# Patient Record
Sex: MALE
[De-identification: names, ages, dates, MRNs, and addresses within clinical notes are randomized per-mention and may not be internally consistent; named-entity substitution may affect disease eponyms.]

---

## 2023-02-07 PROBLEM — Z00.00 ENCOUNTER FOR PREVENTIVE HEALTH EXAMINATION: Status: ACTIVE | Noted: 2023-02-07

## 2023-02-08 ENCOUNTER — APPOINTMENT (OUTPATIENT)
Dept: UROLOGY | Facility: CLINIC | Age: 48
End: 2023-02-08
Payer: COMMERCIAL

## 2023-02-08 VITALS
OXYGEN SATURATION: 97 % | WEIGHT: 240 LBS | SYSTOLIC BLOOD PRESSURE: 152 MMHG | RESPIRATION RATE: 18 BRPM | HEIGHT: 68 IN | BODY MASS INDEX: 36.37 KG/M2 | HEART RATE: 87 BPM | DIASTOLIC BLOOD PRESSURE: 79 MMHG

## 2023-02-08 DIAGNOSIS — Z78.9 OTHER SPECIFIED HEALTH STATUS: ICD-10-CM

## 2023-02-08 DIAGNOSIS — Z83.3 FAMILY HISTORY OF DIABETES MELLITUS: ICD-10-CM

## 2023-02-08 DIAGNOSIS — I10 ESSENTIAL (PRIMARY) HYPERTENSION: ICD-10-CM

## 2023-02-08 DIAGNOSIS — Z82.49 FAMILY HISTORY OF ISCHEMIC HEART DISEASE AND OTHER DISEASES OF THE CIRCULATORY SYSTEM: ICD-10-CM

## 2023-02-08 DIAGNOSIS — R31.29 OTHER MICROSCOPIC HEMATURIA: ICD-10-CM

## 2023-02-08 DIAGNOSIS — Z72.0 TOBACCO USE: ICD-10-CM

## 2023-02-08 LAB
BILIRUB UR QL STRIP: NEGATIVE
GLUCOSE UR-MCNC: NEGATIVE
HCG UR QL: 0.2 EU/DL
HGB UR QL STRIP.AUTO: NORMAL
KETONES UR-MCNC: NEGATIVE
LEUKOCYTE ESTERASE UR QL STRIP: NEGATIVE
NITRITE UR QL STRIP: NEGATIVE
PH UR STRIP: 6
PROT UR STRIP-MCNC: NEGATIVE
SP GR UR STRIP: >=1.03

## 2023-02-08 PROCEDURE — 81003 URINALYSIS AUTO W/O SCOPE: CPT | Mod: QW

## 2023-02-08 PROCEDURE — 99203 OFFICE O/P NEW LOW 30 MIN: CPT

## 2023-02-08 NOTE — LETTER BODY
[Dear  ___] : Dear  [unfilled], [Consult Letter:] : I had the pleasure of evaluating your patient, [unfilled]. [Please see my note below.] : Please see my note below. [Consult Closing:] : Thank you very much for allowing me to participate in the care of this patient.  If you have any questions, please do not hesitate to contact me. [Sincerely,] : Sincerely, [FreeTextEntry3] : Jorge Fischer, DO\par Genitourinary Medicine\par

## 2023-02-08 NOTE — HISTORY OF PRESENT ILLNESS
[FreeTextEntry1] : Mr. LOIS CHATTERJEE 47 year old  M  PMH HTN and no PSH. Pt comes in bc he was told he had blood in his urine. Pt has never seen blood. Pt urinating well. Erections are good. No FMH of cancer. Current smoker for about 25 years. \par

## 2023-02-09 LAB — PSA SERPL-MCNC: 0.61 NG/ML

## 2023-02-13 LAB
APPEARANCE: CLEAR
BACTERIA UR CULT: NORMAL
BACTERIA: NEGATIVE
BILIRUBIN URINE: NEGATIVE
BLOOD URINE: ABNORMAL
COLOR: YELLOW
GLUCOSE QUALITATIVE U: NEGATIVE
HYALINE CASTS: 0 /LPF
KETONES URINE: NEGATIVE
LEUKOCYTE ESTERASE URINE: NEGATIVE
MICROSCOPIC-UA: NORMAL
NITRITE URINE: NEGATIVE
PH URINE: 6
PROTEIN URINE: NORMAL
RED BLOOD CELLS URINE: 1 /HPF
SPECIFIC GRAVITY URINE: 1.02
SQUAMOUS EPITHELIAL CELLS: 0 /HPF
UROBILINOGEN URINE: NORMAL
WHITE BLOOD CELLS URINE: 0 /HPF

## 2023-02-16 ENCOUNTER — APPOINTMENT (OUTPATIENT)
Dept: UROLOGY | Facility: CLINIC | Age: 48
End: 2023-02-16

## 2024-04-15 ENCOUNTER — NON-APPOINTMENT (OUTPATIENT)
Age: 49
End: 2024-04-15

## 2024-04-18 ENCOUNTER — APPOINTMENT (OUTPATIENT)
Dept: PODIATRY | Facility: CLINIC | Age: 49
End: 2024-04-18
Payer: COMMERCIAL

## 2024-04-18 PROCEDURE — 99204 OFFICE O/P NEW MOD 45 MIN: CPT | Mod: 25

## 2024-04-18 PROCEDURE — 17110 DESTRUCTION B9 LES UP TO 14: CPT

## 2024-04-18 PROCEDURE — 11721 DEBRIDE NAIL 6 OR MORE: CPT

## 2024-04-22 NOTE — ASSESSMENT
[FreeTextEntry1] : Assessment: Onychomycosis caused by T. Rubrum. Peripheral vascular disease. Verruca plantaris, one lesion, left foot. Xerotic dermatitis with fissuring.  Plan: I discussed the treatment plan with the patient regarding the fungal nails.  I discussed oral vs topical treatment.  The patient chose the topical treatment at this time and I explained that it takes approximately 15 months for the nails to grow out.  I explained that he will be returning every other month for follow up for the nails.  Debridement of each toenail on each foot was performed both mechanically and via electrical grinding.  All toenails were trimmed with a 14 cm sterile stainless steel box lock double spring nail splitter.  Then utilizing a sterile pear shaped adilson yolande (this device falls under bur, surgical, general & plastic surgery.  The FDA deems this item a Class-1 device) via a 35,000 RPM electric drill and vacuum and dust extractor system all toenails were aseptically debrided removing fungal layers.  This is done to diminish the fungal load of the toenails and enhance the effects of the antifungal medication, allowing overall improvement in the degree of fungal infection as well as improve appearance and reduce discomfort and help diminish chances of secondary bacterial infection, also lessening the chance of ingrown nails, especially when performed on a regular basis.   Patient was dispensed Clarus Tolnaftate 1% solution to reduce and eliminate the fungal infection in the toenails and to improve the nail quality and decrease pain.  Patient is to apply it to the toenails twice a day.  Patient was dispensed Clean Sweep, which is a non-toxic antimicrobial, antibacterial and antifungal spray used to disinfect the shoe gear.  Patient was instructed to spray it into all pairs of shoes that they own once a week and if they are wearing the shoes every day, at least twice a week.  They were also dispensed written information about the product.  I discussed various treatment options including surgical removal of the wart(s) and the patient opted for conservative treatment at this time.  I then performed aseptic debridement of all the verrucous tissue followed by application of Trichloracetic acid for chemical destruction of the warts. I dispensed to the patient VerruStat topical solution Salicylic Acid 17% in a vehicle containing Retinyl Palmitate and penetration-enhancer MSM Unique wart removing formulation with added Vitamin A derivative, Retinyl Palmitate, and permeation-enhancing agents.  VerruStat has been specially formulated for the topical management and removal of clinically common and palmoplantar warts. VerruStatTM is dispensed only by physicians. The patient was given instructions to apply the medication to the wart(s) 2x a day. I explained that it had a film that builds up and can be filed by the patient, but not picked at.  No covering would be necessary. The patient was advised not to walk barefoot.  I advised the patient that warts were contagious and discussed methods of avoiding the spreading of warts.  Patient is to return in 2 weeks for follow-up. I then performed aseptic debridement of all fissured skin to partial thickness  with a sterile #15 blade to remove the initial layers of dead skin.  Following the initial debridement, an umbrella yolande was then gently applied against the dry skin scraping away the remaining layers, and smoothing the area.   The patient was then advised to moisturize their feet twice daily with an emollient cream. The patient was dispensed Sorbidon Hydrate cream to be applied 2x a day to both feet reduce the dry skin and improve the appearance as well as help prevent cracks in the skin leading to infection.  I advised the patient to notify the office right away if increased redness, swelling, pain, open wounds or discharge were observed.  PTR:  3 weeks.

## 2024-04-22 NOTE — PHYSICAL EXAM
[Ankle Swelling (On Exam)] : present [Ankle Swelling Bilaterally] : severe [] : on both lower extremities [Ankle Swelling On The Right] : mild [Delayed in the Right Toes] : capillary refills normal in right toes [Delayed in the Left Toes] : capillary refills normal in the left toes [0] : left foot dorsalis pedis 0 [TextEntry] : The toenails were elongated, thickened with yellowish discoloration on toes 1, 2, 3, 4 and 5 right foot and 1, 2, 3, 4 and 5 left foot.  Incurvation was noted on multiple toes with erythema around the toenail plates.  Patient exhibited pain upon palpation of the toenails, which caused marked limitation of ambulation.  There is significant lower extremity edema on both feet with nonpalpable pedal pulses.  On the plantar medial aspect of the left arch, there is a full-thickness verrucoid lesion which is painful to palpation.  There are partial and full thickness fissures on both great toes and around the circumferences of both heels.  These areas are painful to palpation.  No active bleeding is present.  No SOI are noted.

## 2024-04-22 NOTE — HISTORY OF PRESENT ILLNESS
[FreeTextEntry1] : Edd Allen is a 48-year-old male patient who presents today for his initial visit complaining of cracks in the skin on both feet, extremely dry skin, and trouble cutting his toenails.  He also thinks he has a wart in his left foot.  He states that he has had his circulation evaluated and his legs do swell, but he has had dry skin around the heels and arches and wanted to have that addressed.

## 2024-05-02 ENCOUNTER — APPOINTMENT (OUTPATIENT)
Dept: PODIATRY | Facility: CLINIC | Age: 49
End: 2024-05-02
Payer: COMMERCIAL

## 2024-05-02 VITALS — HEIGHT: 68 IN | WEIGHT: 315 LBS | BODY MASS INDEX: 47.74 KG/M2

## 2024-05-02 PROCEDURE — 99214 OFFICE O/P EST MOD 30 MIN: CPT | Mod: 25

## 2024-05-02 PROCEDURE — 17110 DESTRUCTION B9 LES UP TO 14: CPT | Mod: LT

## 2024-05-06 NOTE — PHYSICAL EXAM
[TextEntry] : The skin texture is greatly improved.  There are still areas of cracking, full thickness around the right hallux and around both heels.  In the left longitudinal arch, there is a verrucoid lesion.  It is macerated and has black spots.

## 2024-05-06 NOTE — ASSESSMENT
[FreeTextEntry1] : Assessment: Xerosis with fissuring, bilaterally Verruca plantaris, one lesion, left foot. Peripheral vascular disease.  Plan: I then performed aseptic debridement of all fissured skin to partial thickness  with a sterile #15 blade to remove the initial layers of dead skin.  Following the initial debridement, an umbrella yolande was then gently applied against the dry skin scraping away the remaining layers, and smoothing the area.   The patient was then advised to moisturize their feet twice daily with an emollient cream.  I performed with a sterile #15 blade on a sterile # 3 handle aseptic debridement of all the verrucous tissue followed by application of Trichloroacetic acid for chemical destruction of the warts. The patient was instructed to continue to apply the Verrustat to the effected area(s) twice per day and continue with the Clean Sweep spray to disinfect the shoes to help prevent recurrence.  PTR:  2 weeks.

## 2024-05-06 NOTE — HISTORY OF PRESENT ILLNESS
[FreeTextEntry1] : Edd Alejandro returns stating he has been using the Sorbidon and noticed there is a tremendous difference.  He states that the skin on both feet looks better.   He is still feeling the wart in the left arch.  He states that hurts a little bit.

## 2024-06-06 ENCOUNTER — APPOINTMENT (OUTPATIENT)
Dept: PODIATRY | Facility: CLINIC | Age: 49
End: 2024-06-06
Payer: COMMERCIAL

## 2024-06-06 DIAGNOSIS — B07.8 OTHER VIRAL WARTS: ICD-10-CM

## 2024-06-06 DIAGNOSIS — M79.672 PAIN IN LEFT FOOT: ICD-10-CM

## 2024-06-06 DIAGNOSIS — R23.4 CHANGES IN SKIN TEXTURE: ICD-10-CM

## 2024-06-06 DIAGNOSIS — L85.3 XEROSIS CUTIS: ICD-10-CM

## 2024-06-06 PROCEDURE — 99214 OFFICE O/P EST MOD 30 MIN: CPT | Mod: 25

## 2024-06-06 PROCEDURE — 17110 DESTRUCTION B9 LES UP TO 14: CPT

## 2024-06-10 PROBLEM — R23.4 FISSURE IN SKIN OF FOOT: Status: ACTIVE | Noted: 2024-05-06

## 2024-06-10 PROBLEM — L85.3 XEROSIS CUTIS: Status: ACTIVE | Noted: 2024-05-06

## 2024-06-10 PROBLEM — B07.8 VERRUCA PLANA: Status: ACTIVE | Noted: 2024-05-06

## 2024-06-10 PROBLEM — M79.672 LEFT FOOT PAIN: Status: ACTIVE | Noted: 2024-05-06

## 2024-06-10 NOTE — ASSESSMENT
[FreeTextEntry1] : Assessment: Verruca plantaris, one lesion, left foot. Xerosis with fissuring bilaterally.  Plan:   I performed aseptic debridement with a sterile #15 blade on a sterile # 3 handle of all the verrucous tissue followed by application of Trichloracetic acid for chemical destruction of the warts. Normal skin lines were present after the debridement without visualization of black dots.  The patient is to continue with the VerruStat 2x a day for one more week and then discontinue.  The was instructed to continue to disinfect their shoes with the Clean Sweep to prevent recurrence.  Patient is to observe the foot weekly for any new growths and return in 1 month. I then performed aseptic debridement of all fissured skin to partial thickness  with a sterile #15 blade to remove the initial layers of dead skin.  Following the initial debridement, an umbrella yolande was then gently applied against the dry skin scraping away the remaining layers, and smoothing the area.   The patient was then advised to moisturize their feet twice daily with an emollient cream.   PTR:  6 weeks.

## 2024-06-10 NOTE — HISTORY OF PRESENT ILLNESS
[FreeTextEntry1] : Edd Alejandro returns stating both feet are better.  He has been using the cream religiously and notices a great improvement.

## 2024-07-18 ENCOUNTER — APPOINTMENT (OUTPATIENT)
Dept: PODIATRY | Facility: CLINIC | Age: 49
End: 2024-07-18
Payer: COMMERCIAL

## 2024-07-18 VITALS — WEIGHT: 270 LBS | BODY MASS INDEX: 40.92 KG/M2 | HEIGHT: 68 IN

## 2024-07-18 DIAGNOSIS — S91.311A LACERATION W/OUT FOREIGN BODY, RIGHT FOOT, INITIAL ENCOUNTER: ICD-10-CM

## 2024-07-18 DIAGNOSIS — S91.319A LACERATION W/OUT FOREIGN BODY, UNSPECIFIED FOOT, INITIAL ENCOUNTER: ICD-10-CM

## 2024-07-18 PROCEDURE — 99214 OFFICE O/P EST MOD 30 MIN: CPT

## 2024-07-22 PROBLEM — S91.311A LACERATION OF RIGHT FOOT, INITIAL ENCOUNTER: Status: ACTIVE | Noted: 2024-07-22

## 2024-07-22 PROBLEM — S91.319A LACERATION OF FOOT: Status: ACTIVE | Noted: 2024-07-22

## 2024-08-01 ENCOUNTER — APPOINTMENT (OUTPATIENT)
Dept: PODIATRY | Facility: CLINIC | Age: 49
End: 2024-08-01
Payer: COMMERCIAL

## 2024-08-01 DIAGNOSIS — S91.319A LACERATION W/OUT FOREIGN BODY, UNSPECIFIED FOOT, INITIAL ENCOUNTER: ICD-10-CM

## 2024-08-01 DIAGNOSIS — L85.3 XEROSIS CUTIS: ICD-10-CM

## 2024-08-01 DIAGNOSIS — R23.4 CHANGES IN SKIN TEXTURE: ICD-10-CM

## 2024-08-01 PROCEDURE — 99214 OFFICE O/P EST MOD 30 MIN: CPT

## 2024-08-05 NOTE — HISTORY OF PRESENT ILLNESS
[FreeTextEntry1] : Edd returns for followup care of the laceration on the bottom of his right foot.  He states that he kept the bandage intact for three days and it is feeling a lot better.  He has been creaming the feet and they feel better.

## 2024-08-05 NOTE — ASSESSMENT
[FreeTextEntry1] : Assessment: Laceration, right foot. Xerosis with fissuring bilaterally.  P:  I then performed aseptic debridement of all fissured skin to partial thickness  with a sterile #15 blade to remove the initial layers of dead skin.  Following the initial debridement, an umbrella yolande was then gently applied against the dry skin scraping away the remaining layers, and smoothing the area.   The patient was then advised to moisturize their feet twice daily with an emollient cream. I advised the patient to notify the office right away if increased redness, swelling, pain, open wounds or discharge were observed.  PTR:  6 weeks.

## 2024-08-05 NOTE — PHYSICAL EXAM
[TextEntry] : On the plantar surface of the right foot, the laceration has filled in nicely.  There was some dry and cracked skin surrounding it but no longer a laceration.  No signs of infection are present.  Pain is diminished.  There is still significant cracking by the great toes on both feet.

## 2024-08-06 NOTE — PHYSICAL EXAM
[TextEntry] : There is scant verrucoid tissue in the left longitudinal arch.  The area is white with black spots and much less painful.  There is still some cracking around the heels and beneath the great toe joints, but the overall appearance of the feet is better. [Today's Date] : [unfilled] [Name] : Name: [unfilled] [] : : ~~ [Today's Date:] : [unfilled] [Dear  ___:] : Dear Dr. [unfilled]: [Consult] : I had the pleasure of evaluating your patient, [unfilled]. My full evaluation follows. [Consult - Single Provider] : Thank you very much for allowing me to participate in the care of this patient. If you have any questions, please do not hesitate to contact me. [Sincerely,] : Sincerely, [DrQuan  ___] : Dr. PALAFOX [FreeTextEntry4] : DR. RAMON ROSE MD [FreeTextEntry5] : [facility]  [de-identified] : Cass Jackson MD, FAAP, FACC  Pediatric Cardiologist  of Pediatrics [facility]

## 2024-09-12 ENCOUNTER — APPOINTMENT (OUTPATIENT)
Dept: PODIATRY | Facility: CLINIC | Age: 49
End: 2024-09-12

## 2024-10-24 ENCOUNTER — APPOINTMENT (OUTPATIENT)
Dept: PODIATRY | Facility: CLINIC | Age: 49
End: 2024-10-24
Payer: COMMERCIAL

## 2024-10-24 DIAGNOSIS — M79.674 PAIN IN RIGHT TOE(S): ICD-10-CM

## 2024-10-24 DIAGNOSIS — R23.4 CHANGES IN SKIN TEXTURE: ICD-10-CM

## 2024-10-24 DIAGNOSIS — B07.8 OTHER VIRAL WARTS: ICD-10-CM

## 2024-10-24 DIAGNOSIS — L85.3 XEROSIS CUTIS: ICD-10-CM

## 2024-10-24 DIAGNOSIS — B35.1 TINEA UNGUIUM: ICD-10-CM

## 2024-10-24 DIAGNOSIS — M79.675 PAIN IN LEFT TOE(S): ICD-10-CM

## 2024-10-24 DIAGNOSIS — M79.672 PAIN IN LEFT FOOT: ICD-10-CM

## 2024-10-24 PROCEDURE — 99214 OFFICE O/P EST MOD 30 MIN: CPT | Mod: 25

## 2024-10-24 PROCEDURE — 11721 DEBRIDE NAIL 6 OR MORE: CPT

## 2024-10-24 PROCEDURE — 17110 DESTRUCTION B9 LES UP TO 14: CPT

## 2024-10-28 PROBLEM — B35.1 ONYCHOMYCOSIS OF TOENAIL: Status: ACTIVE | Noted: 2024-10-28

## 2024-10-28 PROBLEM — M79.674 PAIN OF TOE OF RIGHT FOOT: Status: ACTIVE | Noted: 2024-10-28

## 2024-10-28 PROBLEM — M79.675 PAIN OF TOE OF LEFT FOOT: Status: ACTIVE | Noted: 2024-10-28

## 2024-12-05 ENCOUNTER — APPOINTMENT (OUTPATIENT)
Dept: PODIATRY | Facility: CLINIC | Age: 49
End: 2024-12-05

## 2024-12-12 ENCOUNTER — APPOINTMENT (OUTPATIENT)
Dept: PODIATRY | Facility: CLINIC | Age: 49
End: 2024-12-12

## 2024-12-12 DIAGNOSIS — B07.8 OTHER VIRAL WARTS: ICD-10-CM

## 2024-12-12 DIAGNOSIS — L85.3 XEROSIS CUTIS: ICD-10-CM

## 2024-12-12 DIAGNOSIS — R23.4 CHANGES IN SKIN TEXTURE: ICD-10-CM

## 2024-12-12 DIAGNOSIS — M79.672 PAIN IN LEFT FOOT: ICD-10-CM

## 2024-12-12 PROCEDURE — 17110 DESTRUCTION B9 LES UP TO 14: CPT

## 2025-01-23 ENCOUNTER — APPOINTMENT (OUTPATIENT)
Dept: PODIATRY | Facility: CLINIC | Age: 50
End: 2025-01-23
Payer: COMMERCIAL

## 2025-01-23 DIAGNOSIS — M79.675 PAIN IN LEFT TOE(S): ICD-10-CM

## 2025-01-23 DIAGNOSIS — L85.3 XEROSIS CUTIS: ICD-10-CM

## 2025-01-23 DIAGNOSIS — M79.674 PAIN IN RIGHT TOE(S): ICD-10-CM

## 2025-01-23 DIAGNOSIS — R23.4 CHANGES IN SKIN TEXTURE: ICD-10-CM

## 2025-01-23 DIAGNOSIS — M79.672 PAIN IN LEFT FOOT: ICD-10-CM

## 2025-01-23 DIAGNOSIS — B07.8 OTHER VIRAL WARTS: ICD-10-CM

## 2025-01-23 DIAGNOSIS — B35.1 TINEA UNGUIUM: ICD-10-CM

## 2025-01-23 PROCEDURE — 99214 OFFICE O/P EST MOD 30 MIN: CPT | Mod: 25

## 2025-01-23 PROCEDURE — 11721 DEBRIDE NAIL 6 OR MORE: CPT

## 2025-01-23 PROCEDURE — 17110 DESTRUCTION B9 LES UP TO 14: CPT

## 2025-03-06 ENCOUNTER — APPOINTMENT (OUTPATIENT)
Dept: PODIATRY | Facility: CLINIC | Age: 50
End: 2025-03-06

## 2025-03-13 ENCOUNTER — APPOINTMENT (OUTPATIENT)
Dept: PODIATRY | Facility: CLINIC | Age: 50
End: 2025-03-13
Payer: COMMERCIAL

## 2025-03-13 DIAGNOSIS — L85.3 XEROSIS CUTIS: ICD-10-CM

## 2025-03-13 DIAGNOSIS — R23.4 CHANGES IN SKIN TEXTURE: ICD-10-CM

## 2025-03-13 PROCEDURE — 99214 OFFICE O/P EST MOD 30 MIN: CPT

## 2025-04-22 ENCOUNTER — NON-APPOINTMENT (OUTPATIENT)
Age: 50
End: 2025-04-22

## 2025-04-24 ENCOUNTER — APPOINTMENT (OUTPATIENT)
Dept: PODIATRY | Facility: CLINIC | Age: 50
End: 2025-04-24
Payer: COMMERCIAL

## 2025-04-24 DIAGNOSIS — R23.4 CHANGES IN SKIN TEXTURE: ICD-10-CM

## 2025-04-24 DIAGNOSIS — B35.1 TINEA UNGUIUM: ICD-10-CM

## 2025-04-24 DIAGNOSIS — M79.674 PAIN IN RIGHT TOE(S): ICD-10-CM

## 2025-04-24 DIAGNOSIS — M79.675 PAIN IN LEFT TOE(S): ICD-10-CM

## 2025-04-24 DIAGNOSIS — L85.3 XEROSIS CUTIS: ICD-10-CM

## 2025-04-24 PROCEDURE — 99214 OFFICE O/P EST MOD 30 MIN: CPT | Mod: 25

## 2025-04-24 PROCEDURE — 11721 DEBRIDE NAIL 6 OR MORE: CPT

## 2025-06-05 ENCOUNTER — APPOINTMENT (OUTPATIENT)
Dept: PODIATRY | Facility: CLINIC | Age: 50
End: 2025-06-05
Payer: COMMERCIAL

## 2025-06-05 PROCEDURE — 99214 OFFICE O/P EST MOD 30 MIN: CPT

## 2025-07-17 ENCOUNTER — APPOINTMENT (OUTPATIENT)
Dept: PODIATRY | Facility: CLINIC | Age: 50
End: 2025-07-17

## 2025-07-31 ENCOUNTER — APPOINTMENT (OUTPATIENT)
Dept: PODIATRY | Facility: CLINIC | Age: 50
End: 2025-07-31
Payer: COMMERCIAL

## 2025-07-31 DIAGNOSIS — R23.4 CHANGES IN SKIN TEXTURE: ICD-10-CM

## 2025-07-31 DIAGNOSIS — M79.675 PAIN IN LEFT TOE(S): ICD-10-CM

## 2025-07-31 DIAGNOSIS — B35.1 TINEA UNGUIUM: ICD-10-CM

## 2025-07-31 DIAGNOSIS — L85.3 XEROSIS CUTIS: ICD-10-CM

## 2025-07-31 DIAGNOSIS — M79.674 PAIN IN RIGHT TOE(S): ICD-10-CM

## 2025-07-31 PROCEDURE — 99214 OFFICE O/P EST MOD 30 MIN: CPT | Mod: 25

## 2025-07-31 PROCEDURE — 11721 DEBRIDE NAIL 6 OR MORE: CPT
